# Patient Record
Sex: FEMALE | Race: WHITE
[De-identification: names, ages, dates, MRNs, and addresses within clinical notes are randomized per-mention and may not be internally consistent; named-entity substitution may affect disease eponyms.]

---

## 2019-02-06 ENCOUNTER — HOSPITAL ENCOUNTER (EMERGENCY)
Dept: HOSPITAL 25 - ED | Age: 29
Discharge: HOME | End: 2019-02-06
Payer: COMMERCIAL

## 2019-02-06 VITALS — DIASTOLIC BLOOD PRESSURE: 81 MMHG | SYSTOLIC BLOOD PRESSURE: 118 MMHG

## 2019-02-06 DIAGNOSIS — Z72.0: ICD-10-CM

## 2019-02-06 DIAGNOSIS — F32.9: ICD-10-CM

## 2019-02-06 DIAGNOSIS — Z88.5: ICD-10-CM

## 2019-02-06 DIAGNOSIS — F41.9: ICD-10-CM

## 2019-02-06 DIAGNOSIS — Z88.0: ICD-10-CM

## 2019-02-06 DIAGNOSIS — K52.9: Primary | ICD-10-CM

## 2019-02-06 DIAGNOSIS — Z87.442: ICD-10-CM

## 2019-02-06 DIAGNOSIS — E03.9: ICD-10-CM

## 2019-02-06 LAB
ALBUMIN SERPL BCG-MCNC: 4.7 G/DL (ref 3.2–5.2)
ALBUMIN/GLOB SERPL: 2 {RATIO} (ref 1–3)
ALP SERPL-CCNC: 44 U/L (ref 34–104)
ALT SERPL W P-5'-P-CCNC: 16 U/L (ref 7–52)
ANION GAP SERPL CALC-SCNC: 6 MMOL/L (ref 2–11)
AST SERPL-CCNC: 15 U/L (ref 13–39)
BASOPHILS # BLD AUTO: 0.1 10^3/UL (ref 0–0.2)
BUN SERPL-MCNC: 9 MG/DL (ref 6–24)
BUN/CREAT SERPL: 11.5 (ref 8–20)
CALCIUM SERPL-MCNC: 9.3 MG/DL (ref 8.6–10.3)
CHLORIDE SERPL-SCNC: 105 MMOL/L (ref 101–111)
EOSINOPHIL # BLD AUTO: 0.1 10^3/UL (ref 0–0.6)
GLOBULIN SER CALC-MCNC: 2.4 G/DL (ref 2–4)
GLUCOSE SERPL-MCNC: 90 MG/DL (ref 70–100)
HCO3 SERPL-SCNC: 25 MMOL/L (ref 22–32)
HCT VFR BLD AUTO: 45 % (ref 35–47)
HGB BLD-MCNC: 15.1 G/DL (ref 12–16)
LYMPHOCYTES # BLD AUTO: 3.8 10^3/UL (ref 1–4.8)
MCH RBC QN AUTO: 32 PG (ref 27–31)
MCHC RBC AUTO-ENTMCNC: 34 G/DL (ref 31–36)
MCV RBC AUTO: 95 FL (ref 80–97)
MONOCYTES # BLD AUTO: 0.5 10^3/UL (ref 0–0.8)
NEUTROPHILS # BLD AUTO: 5.3 10^3/UL (ref 1.5–7.7)
NRBC # BLD AUTO: 0 10^3/UL
NRBC BLD QL AUTO: 0.1
PLATELET # BLD AUTO: 209 10^3/UL (ref 150–450)
POTASSIUM SERPL-SCNC: 4.2 MMOL/L (ref 3.5–5)
PROT SERPL-MCNC: 7.1 G/DL (ref 6.4–8.9)
RBC # BLD AUTO: 4.73 10^6/UL (ref 4–5.4)
SODIUM SERPL-SCNC: 136 MMOL/L (ref 135–145)
WBC # BLD AUTO: 9.8 10^3/UL (ref 3.5–10.8)

## 2019-02-06 PROCEDURE — 80053 COMPREHEN METABOLIC PANEL: CPT

## 2019-02-06 PROCEDURE — 96374 THER/PROPH/DIAG INJ IV PUSH: CPT

## 2019-02-06 PROCEDURE — 96375 TX/PRO/DX INJ NEW DRUG ADDON: CPT

## 2019-02-06 PROCEDURE — 36415 COLL VENOUS BLD VENIPUNCTURE: CPT

## 2019-02-06 PROCEDURE — 85025 COMPLETE CBC W/AUTO DIFF WBC: CPT

## 2019-02-06 PROCEDURE — 83605 ASSAY OF LACTIC ACID: CPT

## 2019-02-06 PROCEDURE — 83690 ASSAY OF LIPASE: CPT

## 2019-02-06 PROCEDURE — 86850 RBC ANTIBODY SCREEN: CPT

## 2019-02-06 PROCEDURE — 84702 CHORIONIC GONADOTROPIN TEST: CPT

## 2019-02-06 PROCEDURE — 96361 HYDRATE IV INFUSION ADD-ON: CPT

## 2019-02-06 PROCEDURE — 86900 BLOOD TYPING SEROLOGIC ABO: CPT

## 2019-02-06 PROCEDURE — 86140 C-REACTIVE PROTEIN: CPT

## 2019-02-06 PROCEDURE — 86901 BLOOD TYPING SEROLOGIC RH(D): CPT

## 2019-02-06 PROCEDURE — 99284 EMERGENCY DEPT VISIT MOD MDM: CPT

## 2019-02-06 PROCEDURE — 93005 ELECTROCARDIOGRAM TRACING: CPT

## 2019-02-06 PROCEDURE — 76705 ECHO EXAM OF ABDOMEN: CPT

## 2019-02-06 PROCEDURE — 81003 URINALYSIS AUTO W/O SCOPE: CPT

## 2019-02-06 NOTE — ED
Abdominal Pain/Female





- HPI Summary


HPI Summary: 


This patient is a 28 year old F presenting to [facility] with a chief complaint of 

epigastric pain since 1 day ago. The patient notes that she vomited several 

times yesterday and then vomited blood. The patient rates the pain 6/10 in 

severity. Symptoms aggravated by nothing. Symptoms alleviated by nothing. 

Patient reports diarrhea. Patient denies fever or any N/V today. Patient denies 

any relief from taking antacids. Pt notes she takes medications for her thyroid 

as well as B12.








- History of Current Complaint


Chief Complaint: EDAbdPain


Stated Complaint: VOMITING BLOOD


Time Seen by Provider: 02/06/19 20:30


Hx Obtained From: Patient


Onset/Duration: Gradual Onset, Lasting Days - 1 day, Still Present


Timing: Days - 1 day


Severity Initially: Mild


Severity Currently: Mild


Pain Intensity: 6


Pain Scale Used: 0-10 Numeric


Location: Discrete At: RUQ, Epigastric


Radiates: No


Aggravating Factor(s): Nothing


Alleviating Factor(s): Nothing


Associated Signs and Symptoms: Positive: Nausea, Vomiting - hematemesis


Allergies/Adverse Reactions: 


 Allergies











Allergy/AdvReac Type Severity Reaction Status Date / Time


 


MS Oxycodone [Oxycodone] Allergy Unknown Unknown Verified 06/05/13 13:27





   Reaction  





   Details  


 


Adhesive Tape Allergy  ITCHING, Verified 06/05/13 13:27





   SWELLING  


 


MS Amoxicillin [Amoxicillin] Allergy  ITCHING Verified 06/05/13 13:27





   AND  





   SWELLING  


 


MS Hydrocodone [Hydrocodone] Allergy  ITCHING Verified 06/05/13 13:27





   AND  





   SWELLING  














PMH/Surg Hx/FS Hx/Imm Hx


Endocrine/Hematology History: Reports: Hx Thyroid Disease - HYPOTHYROID


Respiratory History: Reports: Hx Sleep Apnea, Other Respiratory Problems/

Disorders - IN GENERAL TROUBLE BREATHING, HAS A BAD GAG REFLEX


GI History: Reports: Hx Gastroesophageal Reflux Disease


 History: Reports: Hx Kidney Stones - NONE IN A FEW YEARS


Sensory History: 


   Denies: Hx Contacts or Glasses, Hx Hearing Aid


Opthamlomology History: 


   Denies: Hx Contacts or Glasses


Neurological History: Reports: Hx Headaches


Psychiatric History: Reports: Hx Anxiety - ON MEDICATION FOR, Hx Depression - 

ON MEDICATION FOR





- Surgical History


Surgery Procedure, Year, and Place: SURGERIES FOR KIDNEY STONES


Hx Anesthesia Reactions: No


Infectious Disease History: No


Infectious Disease History: 


   Denies: Traveled Outside the [place] in Last 30 Days





- Family History


Known Family History: 


   Negative: Diabetes





- Social History


Alcohol Use: Occasionally


Substance Use Type: Reports: None


Smoking Status (MU): Heavy Every Day Tobacco Smoker





Review of Systems


Negative: Fever


Negative: Epistaxis


Negative: Cough


Positive: Abdominal Pain - epigastric and RUQ, Diarrhea.  Negative: Vomiting, 

Nausea


Negative: Headache


All Other Systems Reviewed And Are Negative: Yes





Physical Exam





- Summary


Physical Exam Summary: 


VITAL SIGNS: Reviewed.


GENERAL: Patient is a well-developed and nourished FEMALE who is lying 

comfortable in the stretcher. Patient is not in any acute respiratory distress.


HEAD AND FACE: No signs of trauma. No ecchymosis, hematomas or skull 

depressions. No sinus tenderness.


EYES: PERRLA, EOMI x 2, No injected conjunctiva, no nystagmus.


EARS: Hearing grossly intact. Ear canals and tympanic membranes are within 

normal limits.


MOUTH: Oropharynx within normal limits.


NECK: Supple, trachea is midline, no adenopathy, no JVD, no carotid bruit, no c-

spine tenderness, neck with full ROM.


CHEST: Symmetric, no tenderness at palpation


LUNGS: Clear to auscultation bilaterally. No wheezing or crackles.


CVS: Regular rate and rhythm, S1 and S2 present, no murmurs or gallops 

appreciated.


ABDOMEN: Soft, epigastric and RUQ tenderness. No signs of distention. No 

rebound no guarding, and no masses palpated. Bowel sounds are normal.


EXTREMITIES: FROM in all major joints, no edema, no cyanosis or clubbing.


NEURO: Alert and oriented x 3. No acute neurological deficits. Speech is normal 

and follows commands.


SKIN: Dry and warm





Triage Information Reviewed: Yes


Vital Signs On Initial Exam: 


 Initial Vitals











Temp Pulse Resp BP Pulse Ox


 


 97.3 F   87   20   145/90   96 


 


 02/06/19 18:30  02/06/19 18:30  02/06/19 18:30  02/06/19 18:30  02/06/19 18:30











Vital Signs Reviewed: Yes





Diagnostics





- Vital Signs


 Vital Signs











  Temp Pulse Resp BP Pulse Ox


 


 02/06/19 18:30  97.3 F  87  20  145/90  96














- Laboratory


Lab Results: 


 Lab Results











  02/06/19 02/06/19 02/06/19 Range/Units





  20:27 20:27 20:27 


 


WBC  9.8    (3.5-10.8)  10^3/ul


 


RBC  4.73    (4.00-5.40)  10^6/ul


 


Hgb  15.1    (12.0-16.0)  g/dl


 


Hct  45    (35-47)  %


 


MCV  95    (80-97)  fL


 


MCH  32 H    (27-31)  pg


 


MCHC  34    (31-36)  g/dl


 


RDW  12    (10.5-15)  %


 


Plt Count  209    (150-450)  10^3/ul


 


MPV  11.3 H    (7.4-10.4)  fL


 


Neut % (Auto)  Pending    


 


Lymph % (Auto)  Pending    


 


Mono % (Auto)  Pending    


 


Eos % (Auto)  Pending    


 


Baso % (Auto)  Pending    


 


Absolute Neuts (auto)  Pending    


 


Absolute Lymphs (auto)  Pending    


 


Absolute Monos (auto)  Pending    


 


Absolute Eos (auto)  Pending    


 


Absolute Basos (auto)  Pending    


 


Absolute Nucleated RBC  Pending    


 


Nucleated RBC %  Pending    


 


Sodium   136   (135-145)  mmol/L


 


Potassium   4.2   (3.5-5.0)  mmol/L


 


Chloride   105   (101-111)  mmol/L


 


Carbon Dioxide   25   (22-32)  mmol/L


 


Anion Gap   6   (2-11)  mmol/L


 


BUN   9   (6-24)  mg/dL


 


Creatinine   0.78   (0.51-0.95)  mg/dL


 


Est GFR ( Amer)   104.9   (>60)  


 


Est GFR (Non-Af Amer)   86.7   (>60)  


 


BUN/Creatinine Ratio   11.5   (8-20)  


 


Glucose   90   ()  mg/dL


 


Lactic Acid    0.7  (0.5-2.0)  mmol/L


 


Calcium   9.3   (8.6-10.3)  mg/dL


 


Total Bilirubin   0.80   (0.2-1.0)  mg/dL


 


AST   15   (13-39)  U/L


 


ALT   16   (7-52)  U/L


 


Alkaline Phosphatase   44   ()  U/L


 


C-Reactive Protein   < 1.00   (<8.01)  mg/L


 


Total Protein   7.1   (6.4-8.9)  g/dL


 


Albumin   4.7   (3.2-5.2)  g/dL


 


Globulin   2.4   (2-4)  g/dL


 


Albumin/Globulin Ratio   2.0   (1-3)  


 


Lipase   < 10 L   (11.0-82.0)  U/L


 


Beta HCG, Quant     mIU/mL


 


Blood Type     


 


Antibody Screen     














  02/06/19 02/06/19 Range/Units





  20:27 20:27 


 


WBC    (3.5-10.8)  10^3/ul


 


RBC    (4.00-5.40)  10^6/ul


 


Hgb    (12.0-16.0)  g/dl


 


Hct    (35-47)  %


 


MCV    (80-97)  fL


 


MCH    (27-31)  pg


 


MCHC    (31-36)  g/dl


 


RDW    (10.5-15)  %


 


Plt Count    (150-450)  10^3/ul


 


MPV    (7.4-10.4)  fL


 


Neut % (Auto)    


 


Lymph % (Auto)    


 


Mono % (Auto)    


 


Eos % (Auto)    


 


Baso % (Auto)    


 


Absolute Neuts (auto)    


 


Absolute Lymphs (auto)    


 


Absolute Monos (auto)    


 


Absolute Eos (auto)    


 


Absolute Basos (auto)    


 


Absolute Nucleated RBC    


 


Nucleated RBC %    


 


Sodium    (135-145)  mmol/L


 


Potassium    (3.5-5.0)  mmol/L


 


Chloride    (101-111)  mmol/L


 


Carbon Dioxide    (22-32)  mmol/L


 


Anion Gap    (2-11)  mmol/L


 


BUN    (6-24)  mg/dL


 


Creatinine    (0.51-0.95)  mg/dL


 


Est GFR ( Amer)    (>60)  


 


Est GFR (Non-Af Amer)    (>60)  


 


BUN/Creatinine Ratio    (8-20)  


 


Glucose    ()  mg/dL


 


Lactic Acid    (0.5-2.0)  mmol/L


 


Calcium    (8.6-10.3)  mg/dL


 


Total Bilirubin    (0.2-1.0)  mg/dL


 


AST    (13-39)  U/L


 


ALT    (7-52)  U/L


 


Alkaline Phosphatase    ()  U/L


 


C-Reactive Protein    (<8.01)  mg/L


 


Total Protein    (6.4-8.9)  g/dL


 


Albumin    (3.2-5.2)  g/dL


 


Globulin    (2-4)  g/dL


 


Albumin/Globulin Ratio    (1-3)  


 


Lipase    (11.0-82.0)  U/L


 


Beta HCG, Quant   < 0.60  mIU/mL


 


Blood Type  O Positive   


 


Antibody Screen  Pending   











Result Diagrams: 


 02/06/19 20:27





 02/06/19 20:27


Lab Statement: Any lab studies that have been ordered have been reviewed, and 

results considered in the medical decision making process.





- Additional Comments


Diagnostic Additional Comments: 


US gallbladder:


Interpreted by radiologist.


Impression: no acute findings.


Dr. Mercado has reviewed this report.








Abdominal Pain Fem Course/Dx





- Course


Course Of Treatment: This patient is a 28 year old F presenting to [facility] with a 

chief complaint of epigastric pain since 1 day ago. The patient notes that she 

vomited several times yesterday and then vomited blood. Patient reports 

diarrhea. Patient denies fever or any N/V today. US gallbladder reveals, per 

radiologist, no acute findings. ED physician has reviewed this radiology 

report. Test results with no significant abnormalities. In the ED course the 

patient was given protonix, IV fluids, and Reglan. Patient will be discharged 

home with prescription for Reglan and follow up from PCP. Dx gastroenteritis. 

The patient is agreeable with this plan.





- Diagnoses


Provider Diagnoses: 


 Gastroenteritis








Discharge





- Sign-Out/Discharge


Documenting (check all that apply): Patient Departure - discharge home


Patient Received Moderate/Deep Sedation with Procedure: No





- Discharge Plan


Condition: Stable


Disposition: HOME


Prescriptions: 


Metoclopramide TAB* [Reglan TAB*] 10 mg PO Q6H PRN #20 tab


 PRN Reason: Nausea/Vomiting


Pantoprazole TAB * [Protonix TAB*] 40 mg PO DAILY #30 tab


Patient Education Materials:  Gastroenteritis (ED)


Referrals: 


Emiliana Light MD [Primary Care Provider] - 


Additional Instructions: 


Follow up with primary care physician physician in 1-2 days. Return to the 

emergency department with any new or worsening symptoms.





- Attestation Statements


Document Initiated by Scribe: Yes


Documenting Scribe: Lisa Pedroza


Provider For Whom Corryibjonny is Documenting (Include Credential): Sonya Mercado MD


Scribe Attestation: 


Lisa WILSON, scribed for Sonya Mercado MD on 02/06/19 at 2933. 


Status of Scribe Document: Ready